# Patient Record
Sex: MALE | URBAN - METROPOLITAN AREA
[De-identification: names, ages, dates, MRNs, and addresses within clinical notes are randomized per-mention and may not be internally consistent; named-entity substitution may affect disease eponyms.]

---

## 2021-01-20 ENCOUNTER — HOSPITAL ENCOUNTER (OUTPATIENT)
Dept: PHYSICAL THERAPY | Age: 66
Setting detail: THERAPIES SERIES
Discharge: HOME | End: 2021-01-20
Payer: COMMERCIAL

## 2021-01-20 PROCEDURE — 97140 MANUAL THERAPY 1/> REGIONS: CPT

## 2021-01-20 PROCEDURE — 97112 NEUROMUSCULAR REEDUCATION: CPT

## 2021-01-20 PROCEDURE — 97162 PT EVAL MOD COMPLEX 30 MIN: CPT

## 2021-01-20 NOTE — FLOWSHEET NOTE
[facility], [facility],  [place], [address]  Phone: [phone]  Fax [phone]    Physical Therapy Daily Treatment Note    Date:  2021    Patient Name:  Raman Stahl    :  1955  MRN: [id number]  Restrictions/Precautions:    Medical/Treatment Diagnosis Information:  · Diagnosis: L Hip Pain, DDD lumbar, Myofascial Pain  Insurance/Certification information:  PT Insurance Information: [facility]  Physician Information:  Referring Practitioner: Zee Enriquez of care signed (Y/N):  Y  Visit# / total visits:      G-Code (if applicable): Modified Oswestry      Time in:   8:30      Timed Treatment: 25 Total Treatment Time:  60  Time out: 9:30  ________________________________________________________________________________________    Pain Level:    2-4/10  SUBJECTIVE:  See initial evaluation    OBJECTIVE:     Exercise/Equipment Resistance/Repetitions Other comments                 DKTC with feet on TB    x30            Hip IR/ER neuro re-ed   x5 mins B     Clamshells NV    TA sets  NV     Stacking with MB  NV                                                  TG   x6 mins                                  Other Therapeutic Activities:      Manual Treatments:  B hip LAD gr 2-3 with straps followed by B hip IR/ER neuro re-ed resisted training. MET to correct pelvic asymmetry. Pubic symphesis shotgun technique. Gr 4 L1-3 gapping followed by gr 2-3 mobs and needing lumbar paraspinals and QL.          Modalities:      Test/Measurements:         ASSESSMENT:         Treatment/Activity Tolerance:   [x]Patient tolerated treatment well [] Patient limited by fatique  []Patient limited by pain [] Patient limited by other medical complications  [] Other:     Goals:        Long term goals  Time Frame for Long term goals : 6 weeks  Long term goal 1: Pt independent with HEP Long term goal 2: Pt gluteal strength improve by 1/3 muscle grade  Long term goal 3: Pt sit without limits to pain  Long term goal 4: Pt stand without limits to pain  Long term goal 5: Pt walk wtihout limits to pain     Plan: [x] Continue per plan of care [] Alter current plan (see comments)   [x] Plan of care initiated [] Hold pending MD visit [] Discharge      Plan for Next Session:      Re-Certification Due Date:         Signature:  Pritesh Martin , PT

## 2021-01-20 NOTE — PROGRESS NOTES
Physical Therapy  Initial Assessment  Date: 2021  Patient Name: Monique De Guzman  MRN: [id number]  : 1955    Subjective   General  Chart Reviewed: Yes  Patient assessed for rehabilitation services?: Yes  Additional Pertinent Hx: B TKR, THR 2018 L and  R, Cervical disc replacement, Diabetes, HBP  Family / Caregiver Present: No  Referring Practitioner: Arturo Alberts  Referral Date : 20  Diagnosis: L Hip Pain, DDD lumbar, Myofascial Pain  Follows Commands: Within Functional Limits  General Comment  Comments: PLOF: full functional activity without limits to pain. Currently, 80% PLOF. PT Visit Information  PT Insurance Information: [facility]  Subjective  Subjective: Pt reports 2+ year history of L groin pain. Pt had treatment and trigger point PT, Pelvic floor PT in [place] without improvement. Also had 'shockwave therapy'. Pain improved for 'quite some time' over most of last year. Moved to [place] in October and has worsened over the last few weeks. Pain currently feels like it did 2 years ago. LBP history over the past few years. Epidurals 3-4x per year. MRIs (+) for DDD lumbar. Epidurals help alot. Pain aggravated by activity, movement. Constant pain at 3-4/10 all the time. Wakes at night with movement. Uses Tylenol PM to sleep and rarely sleeps through the night. Sitting increases pain by 30 mins and has to move and stretch. Standing 30 mins but has to move constantly to try to lessen pain but nothing helps reduce it. Walking with pain but not limited with normal day to day walking. No longer walking 1-2 miles for exercise due to pain. Pt retired. Full function with pain. Limited exercise due to pain. Used to ride bike 30 miles per day. Trigger point injection end of  with pain free for 2-3 weeks. 2nd trigger point last week without relief. Does note pain in the L posterior leg at times. No weakness in the L LE. Denies B/B changes.   Pain Screening Patient Currently in Pain: Yes  Pain Assessment  Pain Assessment: 0-10  Pain Level: 4(6/10 pain at worst)  Vital Signs  Patient Currently in Pain: Yes    Objective     Observation/Palpation  Posture: Fair  Palpation: Increased tenderness L iliopsoas, adductors, inguinal, QL and gluteals. Observation: Standing: L trunk lateral shift. Increased R knee varus and hip ER. Slight sway back. Elevated R ilium. Slight toeing in L. Slight R knee flexion in resting posture. Body Mechanics: SLS L with hip IR and LOB. R with hip IR, trendelenberg and more LOB. Decreased stability on R with pain in L groin flexing hip. SLS with squat Difficult B, R worse than L, Increased B K' valgus, hip IR with R worse than L. Squat test: significant R anabela out, quad dominance and B hip ER. AMB:  significant L hip IR and toeing in without pushoff. Antalgic gait on L with decreased SLS on R. PROM RLE (degrees)  RLE PROM: WFL  RLE General PROM: Hip ER 20 degrees, Hip IR 5 degrees  AROM RLE (degrees)  RLE AROM: WFL  PROM LLE (degrees)  LLE PROM: WFL  LLE General PROM: Hip ER 15 degrees, Hip IR 5 degrees  AROM LLE (degrees)  LLE AROM : WFL  Spine  Lumbar: Full FF, extension limited in lumbar segments wtihout pain. SB B decresaed lumbar segments with pain in L hip with R SB. Rotation lmiited lumbar segments without pain. Joint Mobility  Spine: Hypomobitily upper lumbar, B SI  ROM RLE: Hypomobility R hip joint  ROM LLE: Hypomobility L hip joint    Strength RLE  Strength RLE: WNL  Comment: Hip ER 4-/5; Hip IR 4/5; PGM 3-/5  Strength LLE  Strength LLE: WNL  Comment: Hip ER 4-/5; Hip IR 4-/5; PGM 3-/5     Additional Measures  Flexibility: Decreased B hip ER and IR, R 20 degrees ER and 5 degrees IR, L 15 degrees ER and 10 degrees IR. Special Tests: LLD L shorter than R, L AI. Other: DTR 1+ B patellar, 2+ achilles. Normal sensation to pin prick B LE dermatomes, (-) clonus, (-) babinski.     Assessment Conditions Requiring Skilled Therapeutic Intervention  Body structures, Functions, Activity limitations: Decreased functional mobility ; Decreased ROM; Decreased strength;Decreased endurance  Prognosis: Fair  Decision Making: Medium Complexity  REQUIRES PT FOLLOW UP: Yes         Plan   Plan  Times per week: 2x/wk for 6 weeks  Current Treatment Recommendations: Strengthening, Neuromuscular Re-education, Home Exercise Program, ROM, Manual Therapy - Soft Tissue Mobilization, Endurance Training, Patient/Caregiver Education & Training, Manual Therapy - Joint Manipulation, Gait Training    G-Code  Modified Oswestry  44% disability     Goals  Long term goals  Time Frame for Long term goals : 6 weeks  Long term goal 1: Pt independent with HEP  Long term goal 2: Pt gluteal strength improve by 1/3 muscle grade  Long term goal 3: Pt sit without limits to pain  Long term goal 4: Pt stand without limits to pain  Long term goal 5: Pt walk wtihout limits to pain       Therapy Time   Individual Concurrent Group Co-treatment   Time In  8:30         Time Out  9:30         Minutes  25                 Vanessa Delacruz, PT

## 2021-01-25 ENCOUNTER — APPOINTMENT (OUTPATIENT)
Dept: PHYSICAL THERAPY | Age: 66
End: 2021-01-25
Payer: COMMERCIAL

## 2021-01-28 ENCOUNTER — HOSPITAL ENCOUNTER (OUTPATIENT)
Dept: PHYSICAL THERAPY | Age: 66
Setting detail: THERAPIES SERIES
Discharge: HOME | End: 2021-01-28
Payer: COMMERCIAL

## 2021-01-28 PROCEDURE — 97110 THERAPEUTIC EXERCISES: CPT

## 2021-01-28 PROCEDURE — 97112 NEUROMUSCULAR REEDUCATION: CPT

## 2021-01-28 PROCEDURE — 97140 MANUAL THERAPY 1/> REGIONS: CPT

## 2021-01-28 NOTE — FLOWSHEET NOTE
[facility], [facility], [facility], [address] [place] [address] Dr  Phone: [phone]  Fax [phone]    Physical Therapy Daily Treatment Note    Date:  2021    Patient Name:  Fiorella Henderson    :  1955  MRN: [id number]  Restrictions/Precautions:  B TKR and B THR   Medical/Treatment Diagnosis Information:  · Diagnosis: L Hip Pain, DDD lumbar, Myofascial Pain  Insurance/Certification information:  PT Insurance Information: [facility]  Physician Information:  Referring Practitioner: Omar Marin  jayjay signed (Y/N):  Y  Visit# / total visits:      G-Code (if applicable): Modified Oswestry  44% disability      Time in:   9:07      Timed Treatment: 45 Total Treatment Time:  45  Time out: 9:45  ________________________________________________________________________________________    Pain Level:    2-4/10  SUBJECTIVE:  Pt notes pain dissipated about 25-30% after last session through the next couple of days. Then pain returned. OBJECTIVE:     Exercise/Equipment Resistance/Repetitions Other comments                 DKTC with feet on TB    x30     Bridging    10x5 secs  HEP video    Hip IR/ER neuro re-ed   x5 mins B     Clamshells 10x5 secs L HEP video    TA sets with BP cuff  Until achieved   x10 B  NV  HEP video   Stacking with MB  NV                                                  TG   x6 mins                                  Other Therapeutic Activities:      Manual Treatments:  B hip LAD gr 2-3 with straps followed by B hip IR/ER neuro re-ed resisted training. Gr 4 L1-3 gapping with cavitation followed by gr 2-3 mobs and needing lumbar paraspinals and QL. STM L QL.          Modalities:      Test/Measurements:         ASSESSMENT:         Treatment/Activity Tolerance:   [x]Patient tolerated treatment well [] Patient limited by fatique  []Patient limited by pain [] Patient limited by other medical complications  [] Other: Goals:        Long term goals  Time Frame for Long term goals : 6 weeks  Long term goal 1: Pt independent with HEP  Long term goal 2: Pt gluteal strength improve by 1/3 muscle grade  Long term goal 3: Pt sit without limits to pain  Long term goal 4: Pt stand without limits to pain  Long term goal 5: Pt walk wtihout limits to pain     Plan: [x] Continue per plan of care [] Alter current plan (see comments)   [] Plan of care initiated [] Hold pending MD visit [] Discharge      Plan for Next Session:      Re-Certification Due Date:         Signature:  Pritesh Martin , PT

## 2021-02-01 ENCOUNTER — APPOINTMENT (OUTPATIENT)
Dept: PHYSICAL THERAPY | Age: 66
End: 2021-02-01
Payer: COMMERCIAL

## 2021-02-04 ENCOUNTER — HOSPITAL ENCOUNTER (OUTPATIENT)
Dept: PHYSICAL THERAPY | Age: 66
Setting detail: THERAPIES SERIES
Discharge: HOME | End: 2021-02-04
Payer: COMMERCIAL

## 2021-02-04 PROCEDURE — 97140 MANUAL THERAPY 1/> REGIONS: CPT

## 2021-02-04 PROCEDURE — 97110 THERAPEUTIC EXERCISES: CPT

## 2021-02-04 NOTE — FLOWSHEET NOTE
[facility], [facility], [address] [facility], [address] [facility]  Phone: [phone]  Fax [phone]    Physical Therapy Daily Treatment Note    Date:  2021    Patient Name:  Jackeline Ramirez    :  1955  MRN: [id number]  Restrictions/Precautions:  B TKR and B THR   Medical/Treatment Diagnosis Information:  · Diagnosis: L Hip Pain, DDD lumbar, Myofascial Pain  Insurance/Certification information:  PT Insurance Information: [facility]  Physician Information:  Referring Practitioner: Yaya Poe of care signed (Y/N):  Y  Visit# / total visits:  3/12    G-Code (if applicable): Modified Oswestry  44% disability      Time in:   9:05      Timed Treatment: 45 Total Treatment Time:  45  Time out: 9:45  ________________________________________________________________________________________    Pain Level:    310  SUBJECTIVE:  Pt notes pain improved after last session Thursday through the weekend. Pt doing HEP every other day. Pt had an injection in the groin on Monday and has felt better since that time. OBJECTIVE:     Exercise/Equipment Resistance/Repetitions Other comments                 DKTC with feet on TB    x30     Bridging    10x5 secs  HEP video    Hip IR/ER neuro re-ed   x6 mins B     Clamshells 10x5 secs L HEP video    TA sets with BP cuff  Until achieved   x10 B  x10 B  HEP video   Stacking with MB  2x30 secs   HEP    S' extension   x15 B maroon TB              Rockerboard     x1 min F/B M/L  x1 min rocking each                         TG   x6 mins                                  Other Therapeutic Activities:      Manual Treatments:  B hip LAD gr 2-3 with straps followed by B hip IR/ER neuro re-ed resisted training. Gr 4 L1-3 gapping with cavitation followed by gr 2-3 mobs and needing lumbar paraspinals and QL. STM L QL.          Modalities:      Test/Measurements:         ASSESSMENT:   Pt responding well to manual therapy and therex progression but continues to c/o pain in the L groin with hip flexion motion.         Treatment/Activity Tolerance:   [x]Patient tolerated treatment well [] Patient limited by fatique  []Patient limited by pain [] Patient limited by other medical complications  [] Other:     Goals:        Long term goals  Time Frame for Long term goals : 6 weeks  Long term goal 1: Pt independent with HEP  Long term goal 2: Pt gluteal strength improve by 1/3 muscle grade  Long term goal 3: Pt sit without limits to pain  Long term goal 4: Pt stand without limits to pain  Long term goal 5: Pt walk wtihout limits to pain     Plan: [x] Continue per plan of care [] Alter current plan (see comments)   [] Plan of care initiated [] Hold pending MD visit [] Discharge      Plan for Next Session:      Re-Certification Due Date:         Signature:  Ansley Busch , PT

## 2021-02-08 ENCOUNTER — APPOINTMENT (OUTPATIENT)
Dept: PHYSICAL THERAPY | Age: 66
End: 2021-02-08
Payer: COMMERCIAL

## 2021-02-11 ENCOUNTER — APPOINTMENT (OUTPATIENT)
Dept: PHYSICAL THERAPY | Age: 66
End: 2021-02-11
Payer: COMMERCIAL

## 2021-02-15 ENCOUNTER — APPOINTMENT (OUTPATIENT)
Dept: PHYSICAL THERAPY | Age: 66
End: 2021-02-15
Payer: COMMERCIAL

## 2021-02-18 ENCOUNTER — HOSPITAL ENCOUNTER (OUTPATIENT)
Dept: PHYSICAL THERAPY | Age: 66
Setting detail: THERAPIES SERIES
Discharge: HOME | End: 2021-02-18
Payer: COMMERCIAL

## 2021-02-18 PROCEDURE — 97140 MANUAL THERAPY 1/> REGIONS: CPT

## 2021-02-18 PROCEDURE — 97112 NEUROMUSCULAR REEDUCATION: CPT

## 2021-02-18 NOTE — FLOWSHEET NOTE
[facility], [facility], [address] [place] [address], [address] [place] [address] Dr  Phone: [phone]  Fax [phone]    Physical Therapy Daily Treatment Note    Date:  2021    Patient Name:  Alfredo Davis    :  1955  MRN: [id number]  Restrictions/Precautions:  B TKR and B THR   Medical/Treatment Diagnosis Information:  · Diagnosis: L Hip Pain, DDD lumbar, Myofascial Pain  Insurance/Certification information:  PT Insurance Information: [facility]  Physician Information:  Referring Practitioner: Mu Dale of care signed (Y/N):  Y  Visit# / total visits:      G-Code (if applicable): Modified Oswestry  44% disability      Time in:   8:56     Timed Treatment: 45 Total Treatment Time:  45  Time out: 9:41  ________________________________________________________________________________________    Pain Level:    5/10  SUBJECTIVE:  Pt notes pain has worsened over the past few days especially in the groin and along the inguinal.        OBJECTIVE:     Exercise/Equipment Resistance/Repetitions Other comments                 DKTC with feet on TB    x30     Bridging      HEP video    Hip IR/ER neuro re-ed   x6 mins B     Clamshells  HEP video    TA sets with BP cuff  10x5 secs  HEP video   Stacking with MB   HEP    S' extension                Rockerboard                              TG   x6 mins                                  Other Therapeutic Activities:      Manual Treatments:  B hip LAD gr 2-3 with straps followed by B hip IR/ER neuro re-ed resisted training. Gr 4 L1-3 gapping with cavitation followed by gr 2-3 mobs and needing lumbar paraspinals and QL. STM L iliopsoas, neural tracing obturator, STM L adductor groin to the knee followed by manual stretching gentle. Modalities:      Test/Measurements:         ASSESSMENT:   Pt responding well to manual therapy, but continues to c/o pain in the L groin with hip flexion motion.   Held standing therex today due to the acute nature if his pain.          Treatment/Activity Tolerance:   [x]Patient tolerated treatment well [] Patient limited by fatique  []Patient limited by pain [] Patient limited by other medical complications  [] Other:     Goals:        Long term goals  Time Frame for Long term goals : 6 weeks  Long term goal 1: Pt independent with HEP  Long term goal 2: Pt gluteal strength improve by 1/3 muscle grade  Long term goal 3: Pt sit without limits to pain  Long term goal 4: Pt stand without limits to pain  Long term goal 5: Pt walk wtihout limits to pain     Plan: [x] Continue per plan of care [] Alter current plan (see comments)   [] Plan of care initiated [] Hold pending MD visit [] Discharge      Plan for Next Session:      Re-Certification Due Date:         Signature:  Anastasiia Sheffield PT

## 2021-02-22 ENCOUNTER — HOSPITAL ENCOUNTER (OUTPATIENT)
Dept: PHYSICAL THERAPY | Age: 66
Setting detail: THERAPIES SERIES
End: 2021-02-22
Payer: COMMERCIAL

## 2021-02-25 ENCOUNTER — APPOINTMENT (OUTPATIENT)
Dept: PHYSICAL THERAPY | Age: 66
End: 2021-02-25
Payer: COMMERCIAL